# Patient Record
Sex: MALE | Race: BLACK OR AFRICAN AMERICAN | NOT HISPANIC OR LATINO
[De-identification: names, ages, dates, MRNs, and addresses within clinical notes are randomized per-mention and may not be internally consistent; named-entity substitution may affect disease eponyms.]

---

## 2020-10-12 ENCOUNTER — APPOINTMENT (OUTPATIENT)
Dept: SURGERY | Facility: CLINIC | Age: 75
End: 2020-10-12

## 2020-10-26 ENCOUNTER — APPOINTMENT (OUTPATIENT)
Dept: SURGERY | Facility: CLINIC | Age: 75
End: 2020-10-26
Payer: COMMERCIAL

## 2020-10-26 VITALS
SYSTOLIC BLOOD PRESSURE: 165 MMHG | BODY MASS INDEX: 28.95 KG/M2 | HEIGHT: 68 IN | WEIGHT: 191 LBS | HEART RATE: 62 BPM | TEMPERATURE: 98 F | DIASTOLIC BLOOD PRESSURE: 90 MMHG

## 2020-10-26 DIAGNOSIS — I10 ESSENTIAL (PRIMARY) HYPERTENSION: ICD-10-CM

## 2020-10-26 DIAGNOSIS — E78.00 PURE HYPERCHOLESTEROLEMIA, UNSPECIFIED: ICD-10-CM

## 2020-10-26 DIAGNOSIS — Z78.9 OTHER SPECIFIED HEALTH STATUS: ICD-10-CM

## 2020-10-26 DIAGNOSIS — K40.90 UNILATERAL INGUINAL HERNIA, W/OUT OBSTRUCTION OR GANGRENE, NOT SPECIFIED AS RECURRENT: ICD-10-CM

## 2020-10-26 PROCEDURE — 99203 OFFICE O/P NEW LOW 30 MIN: CPT

## 2020-10-26 PROCEDURE — 99072 ADDL SUPL MATRL&STAF TM PHE: CPT

## 2020-10-26 RX ORDER — ASPIRIN 81 MG
81 TABLET, DELAYED RELEASE (ENTERIC COATED) ORAL
Refills: 0 | Status: ACTIVE | COMMUNITY

## 2020-10-26 RX ORDER — BENAZEPRIL HYDROCHLORIDE 5 MG/1
TABLET ORAL
Refills: 0 | Status: ACTIVE | COMMUNITY

## 2020-10-26 RX ORDER — HYDROCHLOROTHIAZIDE 12.5 MG/1
12.5 TABLET ORAL
Refills: 0 | Status: ACTIVE | COMMUNITY

## 2020-10-26 RX ORDER — ATORVASTATIN CALCIUM 80 MG/1
TABLET, FILM COATED ORAL
Refills: 0 | Status: ACTIVE | COMMUNITY

## 2020-10-26 RX ORDER — TAMSULOSIN HYDROCHLORIDE 0.4 MG/1
CAPSULE ORAL
Refills: 0 | Status: ACTIVE | COMMUNITY

## 2020-10-26 RX ORDER — AMLODIPINE BESYLATE 5 MG/1
TABLET ORAL
Refills: 0 | Status: ACTIVE | COMMUNITY

## 2020-10-26 NOTE — CONSULT LETTER
[Dear  ___] : Dear  [unfilled], [Consult Letter:] : I had the pleasure of evaluating your patient, [unfilled]. [Consult Closing:] : Thank you very much for allowing me to participate in the care of this patient.  If you have any questions, please do not hesitate to contact me. [Sincerely,] : Sincerely, [FreeTextEntry3] : Rc Wayne MD\par

## 2020-10-26 NOTE — PLAN
[FreeTextEntry1] : Patient with RIGHT inguinal hernia. Patient was informed of significance of findings. All the options, risks and benefits were discussed. The use of mesh and the small potential for infection, recurrence and other related complications were discussed. Patient wishes to proceed with surgery. We will plan for surgery on at the next available date. Patient agrees to obtain any necessary pre-operative evaluations and testing prior to surgery. Patient's questions and concerns addressed to patient's satisfaction, and patient verbalized an understanding of the information discussed.\par \par Will schedule for the surgery at [facility]. \par \par \par

## 2020-10-26 NOTE — REVIEW OF SYSTEMS
[As Noted in HPI] : as noted in HPI [Negative] : Heme/Lymph [FreeTextEntry8] : right groin discomfort, comes and goes

## 2020-10-26 NOTE — HISTORY OF PRESENT ILLNESS
[de-identified] : NEW MC is a 74 year old M w PMHX, HTN, HLD, CAD with hx of stent placement (10 years ago) on ASA 81mg, who is referred to the office for consultation visit, he presents w the cc of having discomfort to the right side of groin, which is on and off. He notices the discomfort more with movement, walking. Patient states he's been having discomfort >1 month. \par

## 2020-10-26 NOTE — PHYSICAL EXAM
[Normal Breath Sounds] : Normal breath sounds [Normal Rate and Rhythm] : normal rate and rhythm [No Rash or Lesion] : No rash or lesion [Alert] : alert [Oriented to Person] : oriented to person [Oriented to Place] : oriented to place [Oriented to Time] : oriented to time [Calm] : calm [JVD] : no jugular venous distention  [de-identified] : A/Ox3; NAD. appears comfortable [de-identified] : EOMI; sclera anicteric. Nasal mucosa pink, septum midline. Oral mucosa pink. Tongue midline, Pharynx without exudates. [de-identified] : Abd is soft, nondistended, no rebound or guarding. No abdominal masses. No abdominal tenderness. [de-identified] : reducible, right inguinal hernia  [de-identified] : +ROM, no joint swelling

## 2020-11-06 ENCOUNTER — OUTPATIENT (OUTPATIENT)
Dept: OUTPATIENT SERVICES | Facility: HOSPITAL | Age: 75
LOS: 1 days | End: 2020-11-06
Payer: COMMERCIAL

## 2020-11-06 VITALS
OXYGEN SATURATION: 98 % | RESPIRATION RATE: 16 BRPM | SYSTOLIC BLOOD PRESSURE: 126 MMHG | HEIGHT: 68 IN | HEART RATE: 50 BPM | TEMPERATURE: 98 F | WEIGHT: 190.92 LBS | DIASTOLIC BLOOD PRESSURE: 77 MMHG

## 2020-11-06 DIAGNOSIS — Z84.89 FAMILY HISTORY OF OTHER SPECIFIED CONDITIONS: Chronic | ICD-10-CM

## 2020-11-06 DIAGNOSIS — Z98.890 OTHER SPECIFIED POSTPROCEDURAL STATES: Chronic | ICD-10-CM

## 2020-11-06 DIAGNOSIS — I10 ESSENTIAL (PRIMARY) HYPERTENSION: ICD-10-CM

## 2020-11-06 DIAGNOSIS — Z95.5 PRESENCE OF CORONARY ANGIOPLASTY IMPLANT AND GRAFT: ICD-10-CM

## 2020-11-06 DIAGNOSIS — K40.90 UNILATERAL INGUINAL HERNIA, WITHOUT OBSTRUCTION OR GANGRENE, NOT SPECIFIED AS RECURRENT: ICD-10-CM

## 2020-11-06 DIAGNOSIS — Z01.818 ENCOUNTER FOR OTHER PREPROCEDURAL EXAMINATION: ICD-10-CM

## 2020-11-06 DIAGNOSIS — E78.5 HYPERLIPIDEMIA, UNSPECIFIED: ICD-10-CM

## 2020-11-06 PROCEDURE — G0463: CPT

## 2020-11-06 RX ORDER — TAMSULOSIN HYDROCHLORIDE 0.4 MG/1
1 CAPSULE ORAL
Qty: 0 | Refills: 0 | DISCHARGE

## 2020-11-06 NOTE — H&P PST ADULT - NEGATIVE NEUROLOGICAL SYMPTOMS
no loss of sensation/no headache/no confusion/no focal seizures/no tremors/no hemiparesis/no facial palsy/no syncope/no difficulty walking/no loss of consciousness/no transient paralysis/no generalized seizures/no vertigo/no weakness/no paresthesias

## 2020-11-06 NOTE — H&P PST ADULT - NSICDXPASTSURGICALHX_GEN_ALL_CORE_FT
PAST SURGICAL HISTORY:  FHx: total knee replacement 2010    S/P cardiac cath 2009 [facility] stents x 2

## 2020-11-06 NOTE — H&P PST ADULT - NEGATIVE GASTROINTESTINAL SYMPTOMS
No
no nausea/no vomiting/no constipation/no change in bowel habits/no diarrhea/no flatulence/no abdominal pain

## 2020-11-06 NOTE — H&P PST ADULT - ASSESSMENT
74 years old male presented  with unilateral inguinal hernia without obstruction or gangrene , not specified ans recurrent .

## 2020-11-06 NOTE — H&P PST ADULT - CARDIOVASCULAR COMMENTS
CAD with 2 coronary stents, hx of carotid arteries stenosis, HLD, HTN Cardiology is aware of pt bradycardia- is asymptomatic

## 2020-11-06 NOTE — H&P PST ADULT - NEGATIVE OPHTHALMOLOGIC SYMPTOMS
no blurred vision R/no discharge R/no irritation L/no discharge L/no irritation R/no photophobia/no pain R/no diplopia/no pain L/no blurred vision L/no lacrimation L/no lacrimation R

## 2020-11-06 NOTE — H&P PST ADULT - HISTORY OF PRESENT ILLNESS
74 years old male presented to [facility] for evaluation before surgery, patient was diagnosed with unilateral inguinal hernia without obstruction or gangrene , not specified ans recurrent and is scheduled for right inguinal hernia repair with mesh on 11/13/2020.

## 2020-11-06 NOTE — H&P PST ADULT - NSICDXPASTMEDICALHX_GEN_ALL_CORE_FT
PAST MEDICAL HISTORY:  BPH (benign prostatic hypertrophy)     Bradycardia Cardiology is aware , pt is cleared , no cardiac symptoms, no dizziness, no fainting- asymptomatic    Coronary artery disease involving native coronary artery of native heart, angina presence unspecified     Essential hypertension     Glaucoma both eyes    History of carotid artery disease f/u with Cardiology    Hypercholesterolemia     Stented coronary artery in 2009 - 2 stents

## 2020-11-06 NOTE — H&P PST ADULT - GASTROINTESTINAL DETAILS
bowel sounds normal/no bruit/no distention/no masses palpable/soft/normal/nontender/no rebound tenderness

## 2020-11-06 NOTE — H&P PST ADULT - NEGATIVE BREAST SYMPTOMS
no breast tenderness R/no breast lump L/no nipple discharge L/no breast tenderness L/no breast lump R/no nipple discharge R

## 2020-11-06 NOTE — H&P PST ADULT - NEGATIVE CARDIOVASCULAR SYMPTOMS
no palpitations/no chest pain/no dyspnea on exertion/no orthopnea/no peripheral edema/no claudication/no paroxysmal nocturnal dyspnea

## 2020-11-06 NOTE — H&P PST ADULT - NSICDXPROBLEM_GEN_ALL_CORE_FT
PROBLEM DIAGNOSES  Problem: HLD (hyperlipidemia)  Assessment and Plan: Instructed pt to continue Atorvastatin. Follow-up with PCP postop for lipid management      Problem: Hypertension  Assessment and Plan: Continue antihypertensive meds and take with sips of water on day of surgery.  Cleared by Cardiology .Follow-up with PCP postop for management.    Problem: Stented coronary artery  Assessment and Plan: Pt to continue ASA 81 mg everyday including am of sx, pt verbalized understanding , pt has Cardiology clearance     Problem: Unilateral inguinal hernia, without obstruction or gangrene, not specified as recurrent  Assessment and Plan: Patient is scheduled for right inguinal hernia repair with mesh on 11/13/2020.

## 2020-11-06 NOTE — H&P PST ADULT - RS GEN PE MLT RESP DETAILS PC
no wheezes/no rhonchi/airway patent/breath sounds equal/good air movement/clear to auscultation bilaterally/no rales

## 2020-11-06 NOTE — H&P PST ADULT - NEGATIVE GENERAL GENITOURINARY SYMPTOMS
normal urinary frequency/no hematuria/no urinary hesitancy/no renal colic/no dysuria/no incontinence/BPH on  meds

## 2020-11-10 ENCOUNTER — APPOINTMENT (OUTPATIENT)
Dept: DISASTER EMERGENCY | Facility: CLINIC | Age: 75
End: 2020-11-10

## 2020-11-10 DIAGNOSIS — Z01.818 ENCOUNTER FOR OTHER PREPROCEDURAL EXAMINATION: ICD-10-CM

## 2020-11-11 LAB — SARS-COV-2 N GENE NPH QL NAA+PROBE: NOT DETECTED

## 2020-11-12 ENCOUNTER — TRANSCRIPTION ENCOUNTER (OUTPATIENT)
Age: 75
End: 2020-11-12

## 2020-11-12 LAB
ALBUMIN SERPL ELPH-MCNC: 4.4 G/DL
ALP BLD-CCNC: 93 U/L
ALT SERPL-CCNC: 13 U/L
ANION GAP SERPL CALC-SCNC: 16 MMOL/L
AST SERPL-CCNC: 16 U/L
BASOPHILS # BLD AUTO: 0.07 K/UL
BASOPHILS NFR BLD AUTO: 1.1 %
BILIRUB SERPL-MCNC: 1.4 MG/DL
BUN SERPL-MCNC: 18 MG/DL
CALCIUM SERPL-MCNC: 10.2 MG/DL
CHLORIDE SERPL-SCNC: 101 MMOL/L
CO2 SERPL-SCNC: 26 MMOL/L
CREAT SERPL-MCNC: 1.5 MG/DL
EOSINOPHIL # BLD AUTO: 0.15 K/UL
EOSINOPHIL NFR BLD AUTO: 2.3 %
GLUCOSE SERPL-MCNC: 97 MG/DL
HCT VFR BLD CALC: 44.2 %
HGB BLD-MCNC: 14.5 G/DL
IMM GRANULOCYTES NFR BLD AUTO: 0.2 %
LYMPHOCYTES # BLD AUTO: 1.65 K/UL
LYMPHOCYTES NFR BLD AUTO: 25.5 %
MAN DIFF?: NORMAL
MCHC RBC-ENTMCNC: 30 PG
MCHC RBC-ENTMCNC: 32.8 GM/DL
MCV RBC AUTO: 91.5 FL
MONOCYTES # BLD AUTO: 0.6 K/UL
MONOCYTES NFR BLD AUTO: 9.3 %
NEUTROPHILS # BLD AUTO: 3.98 K/UL
NEUTROPHILS NFR BLD AUTO: 61.6 %
PLATELET # BLD AUTO: 198 K/UL
POTASSIUM SERPL-SCNC: 3.6 MMOL/L
PROT SERPL-MCNC: 6.8 G/DL
RBC # BLD: 4.83 M/UL
RBC # FLD: 12.6 %
SODIUM SERPL-SCNC: 143 MMOL/L
WBC # FLD AUTO: 6.46 K/UL

## 2020-11-13 ENCOUNTER — APPOINTMENT (OUTPATIENT)
Dept: SURGERY | Facility: HOSPITAL | Age: 75
End: 2020-11-13

## 2020-11-13 ENCOUNTER — RESULT REVIEW (OUTPATIENT)
Age: 75
End: 2020-11-13

## 2020-11-13 ENCOUNTER — OUTPATIENT (OUTPATIENT)
Dept: OUTPATIENT SERVICES | Facility: HOSPITAL | Age: 75
LOS: 1 days | End: 2020-11-13
Payer: COMMERCIAL

## 2020-11-13 VITALS
TEMPERATURE: 98 F | SYSTOLIC BLOOD PRESSURE: 148 MMHG | WEIGHT: 195.11 LBS | DIASTOLIC BLOOD PRESSURE: 74 MMHG | HEART RATE: 53 BPM | RESPIRATION RATE: 17 BRPM | OXYGEN SATURATION: 97 % | HEIGHT: 68 IN

## 2020-11-13 VITALS
HEART RATE: 57 BPM | DIASTOLIC BLOOD PRESSURE: 87 MMHG | OXYGEN SATURATION: 96 % | SYSTOLIC BLOOD PRESSURE: 143 MMHG | RESPIRATION RATE: 7 BRPM | TEMPERATURE: 98 F

## 2020-11-13 DIAGNOSIS — Z98.890 OTHER SPECIFIED POSTPROCEDURAL STATES: Chronic | ICD-10-CM

## 2020-11-13 DIAGNOSIS — K40.90 UNILATERAL INGUINAL HERNIA, WITHOUT OBSTRUCTION OR GANGRENE, NOT SPECIFIED AS RECURRENT: ICD-10-CM

## 2020-11-13 DIAGNOSIS — Z84.89 FAMILY HISTORY OF OTHER SPECIFIED CONDITIONS: Chronic | ICD-10-CM

## 2020-11-13 PROCEDURE — 88302 TISSUE EXAM BY PATHOLOGIST: CPT | Mod: 26

## 2020-11-13 PROCEDURE — 49525 REPAIR ING HERNIA SLIDING: CPT | Mod: RT

## 2020-11-13 PROCEDURE — 49525 REPAIR ING HERNIA SLIDING: CPT | Mod: AS,RT

## 2020-11-13 PROCEDURE — 88302 TISSUE EXAM BY PATHOLOGIST: CPT

## 2020-11-13 PROCEDURE — C1781: CPT

## 2020-11-13 RX ORDER — OXYCODONE AND ACETAMINOPHEN 5; 325 MG/1; MG/1
1 TABLET ORAL EVERY 6 HOURS
Refills: 0 | Status: DISCONTINUED | OUTPATIENT
Start: 2020-11-13 | End: 2020-11-13

## 2020-11-13 RX ORDER — PREGABALIN 225 MG/1
1 CAPSULE ORAL
Qty: 0 | Refills: 0 | DISCHARGE

## 2020-11-13 RX ORDER — SODIUM CHLORIDE 9 MG/ML
3 INJECTION INTRAMUSCULAR; INTRAVENOUS; SUBCUTANEOUS EVERY 8 HOURS
Refills: 0 | Status: DISCONTINUED | OUTPATIENT
Start: 2020-11-13 | End: 2020-11-13

## 2020-11-13 RX ORDER — TAMSULOSIN HYDROCHLORIDE 0.4 MG/1
1 CAPSULE ORAL
Qty: 0 | Refills: 0 | DISCHARGE

## 2020-11-13 RX ORDER — ATORVASTATIN CALCIUM 80 MG/1
1 TABLET, FILM COATED ORAL
Qty: 0 | Refills: 0 | DISCHARGE

## 2020-11-13 RX ORDER — HYDROMORPHONE HYDROCHLORIDE 2 MG/ML
1 INJECTION INTRAMUSCULAR; INTRAVENOUS; SUBCUTANEOUS
Refills: 0 | Status: DISCONTINUED | OUTPATIENT
Start: 2020-11-13 | End: 2020-11-13

## 2020-11-13 RX ORDER — ACETAMINOPHEN WITH CODEINE 300MG-30MG
1 TABLET ORAL
Qty: 16 | Refills: 0
Start: 2020-11-13 | End: 2020-11-16

## 2020-11-13 RX ORDER — LATANOPROST 0.05 MG/ML
1 SOLUTION/ DROPS OPHTHALMIC; TOPICAL
Qty: 0 | Refills: 0 | DISCHARGE

## 2020-11-13 RX ORDER — ONDANSETRON 8 MG/1
4 TABLET, FILM COATED ORAL EVERY 6 HOURS
Refills: 0 | Status: DISCONTINUED | OUTPATIENT
Start: 2020-11-13 | End: 2020-11-20

## 2020-11-13 RX ORDER — SODIUM CHLORIDE 9 MG/ML
1000 INJECTION, SOLUTION INTRAVENOUS
Refills: 0 | Status: DISCONTINUED | OUTPATIENT
Start: 2020-11-13 | End: 2020-11-13

## 2020-11-13 RX ORDER — FOLIC ACID 0.8 MG
1 TABLET ORAL
Qty: 0 | Refills: 0 | DISCHARGE

## 2020-11-13 RX ORDER — HYDROMORPHONE HYDROCHLORIDE 2 MG/ML
0.5 INJECTION INTRAMUSCULAR; INTRAVENOUS; SUBCUTANEOUS
Refills: 0 | Status: DISCONTINUED | OUTPATIENT
Start: 2020-11-13 | End: 2020-11-13

## 2020-11-13 RX ADMIN — SODIUM CHLORIDE 3 MILLILITER(S): 9 INJECTION INTRAMUSCULAR; INTRAVENOUS; SUBCUTANEOUS at 09:37

## 2020-11-13 NOTE — ASU PATIENT PROFILE, ADULT - HEALTHCARE QUESTIONS, PROFILE
Called Danielito to assess how he's feeling after IV Lasix and increased Torsemide dosing. He states his scale hasn't budged. 208.5 lbs yesterday and today. States he just didn't urinate much after the IV Lasix. Thinks he got about 400 ml output after receiving IV Lasix. States he's only voiding 40-50 ml at a time right now, usually he voids more then this. States he couldn't sleep last night so he called the on-call cardiologist who told him to take an extra tab of Torsemide, so he took a total of 80 mg last night.   Told him I would review this with Aster and call him back.     Cecelia Palacios RN    none

## 2020-11-13 NOTE — ASU DISCHARGE PLAN (ADULT/PEDIATRIC) - CARE PROVIDER_API CALL
Rc Wayne (MD)  Surgery  [address] [place] [address] [id number]  Phone: [phone]  Fax: [phone]  Established Patient  Follow Up Time: 1 week

## 2020-11-13 NOTE — ASU DISCHARGE PLAN (ADULT/PEDIATRIC) - CALL YOUR DOCTOR IF YOU HAVE ANY OF THE FOLLOWING:
Swelling that gets worse/Bleeding that does not stop/Pain not relieved by Medications/Wound/Surgical Site with redness, or foul smelling discharge or pus/Fever greater than (need to indicate Fahrenheit or Celsius)

## 2020-11-13 NOTE — ASU DISCHARGE PLAN (ADULT/PEDIATRIC) - WORK/SCHOOL DURATION DAY(S)

## 2020-11-14 ENCOUNTER — TRANSCRIPTION ENCOUNTER (OUTPATIENT)
Age: 75
End: 2020-11-14

## 2020-11-16 PROBLEM — R00.1 BRADYCARDIA, UNSPECIFIED: Chronic | Status: ACTIVE | Noted: 2020-11-06

## 2020-11-16 PROBLEM — H40.9 UNSPECIFIED GLAUCOMA: Chronic | Status: ACTIVE | Noted: 2020-11-06

## 2020-11-16 PROBLEM — Z86.79 PERSONAL HISTORY OF OTHER DISEASES OF THE CIRCULATORY SYSTEM: Chronic | Status: ACTIVE | Noted: 2020-11-06

## 2020-11-17 LAB — SURGICAL PATHOLOGY STUDY: SIGNIFICANT CHANGE UP

## 2020-11-19 ENCOUNTER — APPOINTMENT (OUTPATIENT)
Dept: SURGERY | Facility: CLINIC | Age: 75
End: 2020-11-19
Payer: COMMERCIAL

## 2020-11-19 PROCEDURE — 99024 POSTOP FOLLOW-UP VISIT: CPT

## 2020-11-19 NOTE — REASON FOR VISIT
[Post Op: _________] : a [unfilled] post op visit [FreeTextEntry1] : S/P repair of right inguinal hernia w mesh, 11/13/20

## 2020-11-19 NOTE — PHYSICAL EXAM
[Normal Breath Sounds] : Normal breath sounds [Normal Rate and Rhythm] : normal rate and rhythm [No Rash or Lesion] : No rash or lesion [Alert] : alert [Oriented to Person] : oriented to person [Oriented to Place] : oriented to place [Oriented to Time] : oriented to time [Calm] : calm [JVD] : no jugular venous distention  [de-identified] : A/Ox3; NAD. appears comfortable [de-identified] : / [de-identified] : surgical wound healing well. No signs of inflammation, infection or exudate. No recurrence \par

## 2020-11-19 NOTE — HISTORY OF PRESENT ILLNESS
[de-identified] : NEW MC presents to the office for postoperative visit today, he is S/P repair of right inguinal hernia w mesh, 11/13/20. Patient states he's feeling well overall, has some mild swelling to the incision site. No fevers/chills. No pain. No urinary complaints.

## 2020-11-19 NOTE — ASSESSMENT
[FreeTextEntry1] : NEW MC presents to the office for postoperative visit today, he is S/P repair of right inguinal hernia w mesh, 11/13/20. Patient is feeling well, denies pain. \par \par Surgical wounds are healing well. No signs of inflammation, infection or exudate. No recurrence. \par There is no evidence of infection/complication, and patient is progressing as expected. Post-operative wound care, activities, restrictions and precautions were reinforced. \par \par Patient's questions and concerns addressed to patient's satisfaction.\par \par \par

## 2021-10-14 ENCOUNTER — APPOINTMENT (OUTPATIENT)
Dept: UROLOGY | Facility: CLINIC | Age: 76
End: 2021-10-14
Payer: COMMERCIAL

## 2021-10-14 VITALS
HEIGHT: 68 IN | BODY MASS INDEX: 28.95 KG/M2 | TEMPERATURE: 98.1 F | RESPIRATION RATE: 13 BRPM | HEART RATE: 80 BPM | DIASTOLIC BLOOD PRESSURE: 74 MMHG | WEIGHT: 191 LBS | SYSTOLIC BLOOD PRESSURE: 134 MMHG | OXYGEN SATURATION: 94 %

## 2021-10-14 DIAGNOSIS — R35.1 BENIGN PROSTATIC HYPERPLASIA WITH LOWER URINARY TRACT SYMPMS: ICD-10-CM

## 2021-10-14 DIAGNOSIS — N28.1 CYST OF KIDNEY, ACQUIRED: ICD-10-CM

## 2021-10-14 DIAGNOSIS — I51.9 HEART DISEASE, UNSPECIFIED: ICD-10-CM

## 2021-10-14 DIAGNOSIS — N40.1 BENIGN PROSTATIC HYPERPLASIA WITH LOWER URINARY TRACT SYMPMS: ICD-10-CM

## 2021-10-14 PROCEDURE — 99203 OFFICE O/P NEW LOW 30 MIN: CPT

## 2021-10-14 NOTE — ASSESSMENT
[FreeTextEntry1] : He does not seem to be significantly bothered by urinary symptoms.  Nocturia is 1 or 2 times in the residual urine volume is minimal.  We reviewed his ultrasound report on the computer screen.  Simple renal cysts are sacs filled with fluid. The exact cause of renal cysts is unknown but cysts are more common with advancing age. Up to one third of patients over age 70 have renal cysts. Having many renal cysts is different than polycystic renal disease. Simple renal cysts are usually found incidentally with imaging studies (US, CT scan or MRI), are usually asymptomatic and do not require any treatment. Very large renal cysts can cause dull pain or discomfort. Cysts can rarely become infected, develop bleeding, rupture or impair renal function. Surgical removal or drainage and sclerotherapy of renal cysts can be performed in specific clinical settings. Bosniak classification of renal cysts into 5 categories was reviewed: simple (category I), minimally complex (category II and IIF), indeterminate (category III) and solid (category IV). Malignancy risk, treatment and follow-up of renal cysts based on category were discussed. Patient's questions were answered.  Pending insurance approval MRI of the abdomen with and without contrast will be ordered and then we will discuss the next step on the phone.\par \par Jamaal Currie MD, FACS\par [facility]\par  of Urology\par \par [address], [address]\[place] [address]\par \par [address], [address]\[place] [address]\par \par Tel: [phone]\par Fax: [phone]

## 2021-10-14 NOTE — HISTORY OF PRESENT ILLNESS
[FreeTextEntry1] : He is a 75-year-old man who is seen today for initial visit.  Generally nocturia is 1-2 times.  Urinary flow is average.  There is no hematuria, dysuria or flank pain.  Residual urine today was 60 cc.  In September 2020, PSA level was 2.78 and creatinine 1.5.  Urinalysis was normal.  Ultrasound in June 2021 from [facility] radiology showed right renal 2.3 cm complex renal cyst, left 6 mm cyst with calcification and other bilateral simple renal cysts.

## 2021-10-14 NOTE — REVIEW OF SYSTEMS
[Eyesight Problems] : eyesight problems [Dry Eyes] : dryness of the eyes [Loss of interest] : loss of interest in sexual activity [Joint Swelling] : joint swelling [Negative] : Heme/Lymph

## 2021-10-14 NOTE — LETTER BODY
[Dear  ___] : Dear  [unfilled], [Consult Letter:] : I had the pleasure of evaluating your patient, [unfilled]. [Consult Closing:] : Thank you very much for allowing me to participate in the care of this patient.  If you have any questions, please do not hesitate to contact me. [FreeTextEntry1] : [facility]\par [address] \[place], [place], [address] \par [phone]

## 2021-10-14 NOTE — PHYSICAL EXAM
[General Appearance - Well Developed] : well developed [General Appearance - Well Nourished] : well nourished [Normal Appearance] : normal appearance [Well Groomed] : well groomed [General Appearance - In No Acute Distress] : no acute distress [Edema] : no peripheral edema [Respiration, Rhythm And Depth] : normal respiratory rhythm and effort [Exaggerated Use Of Accessory Muscles For Inspiration] : no accessory muscle use [Abdomen Soft] : soft [Abdomen Tenderness] : non-tender [Costovertebral Angle Tenderness] : no ~M costovertebral angle tenderness [Urethral Meatus] : meatus normal [Penis Abnormality] : normal uncircumcised penis [Urinary Bladder Findings] : the bladder was normal on palpation [Scrotum] : the scrotum was normal [Testes Tenderness] : no tenderness of the testes [Testes Mass (___cm)] : there were no testicular masses [Prostate Tenderness] : the prostate was not tender [No Prostate Nodules] : no prostate nodules [Prostate Enlarged] : was enlarged [Normal Station and Gait] : the gait and station were normal for the patient's age [] : no rash [No Focal Deficits] : no focal deficits [Oriented To Time, Place, And Person] : oriented to person, place, and time [Affect] : the affect was normal [Mood] : the mood was normal [Not Anxious] : not anxious [Inguinal Lymph Nodes Enlarged Bilaterally] : inguinal

## 2021-12-09 ENCOUNTER — APPOINTMENT (OUTPATIENT)
Dept: MRI IMAGING | Facility: CLINIC | Age: 76
End: 2021-12-09
Payer: COMMERCIAL

## 2021-12-09 ENCOUNTER — OUTPATIENT (OUTPATIENT)
Dept: OUTPATIENT SERVICES | Facility: HOSPITAL | Age: 76
LOS: 1 days | End: 2021-12-09
Payer: COMMERCIAL

## 2021-12-09 DIAGNOSIS — Z98.890 OTHER SPECIFIED POSTPROCEDURAL STATES: Chronic | ICD-10-CM

## 2021-12-09 DIAGNOSIS — N28.1 CYST OF KIDNEY, ACQUIRED: ICD-10-CM

## 2021-12-09 DIAGNOSIS — Z84.89 FAMILY HISTORY OF OTHER SPECIFIED CONDITIONS: Chronic | ICD-10-CM

## 2021-12-09 PROCEDURE — 74183 MRI ABD W/O CNTR FLWD CNTR: CPT

## 2021-12-09 PROCEDURE — 74183 MRI ABD W/O CNTR FLWD CNTR: CPT | Mod: 26

## 2021-12-09 PROCEDURE — A9585: CPT

## 2024-03-27 ENCOUNTER — APPOINTMENT (OUTPATIENT)
Dept: ORTHOPEDIC SURGERY | Facility: CLINIC | Age: 79
End: 2024-03-27
Payer: COMMERCIAL

## 2024-03-27 VITALS — BODY MASS INDEX: 28.79 KG/M2 | WEIGHT: 190 LBS | HEIGHT: 68 IN

## 2024-03-27 DIAGNOSIS — M17.12 UNILATERAL PRIMARY OSTEOARTHRITIS, LEFT KNEE: ICD-10-CM

## 2024-03-27 PROCEDURE — 99203 OFFICE O/P NEW LOW 30 MIN: CPT

## 2024-03-27 PROCEDURE — 73564 X-RAY EXAM KNEE 4 OR MORE: CPT | Mod: LT

## 2024-03-27 NOTE — CONSULT LETTER
[Dear  ___] : Dear  [unfilled], [Sincerely,] : Sincerely, [FreeTextEntry3] : Dr. Tone Hayward [facility] [address] [place] [address]  [phone]

## 2024-03-27 NOTE — HISTORY OF PRESENT ILLNESS
[de-identified] : NEW MC is a 78 year old male who presents for initial evaluation of left knee pain. Patient presents ambulating independently.  Patient reports pain intermittently since 2023. Denies any trauma or injury. Patient reports recent aggravation of symptoms while hiking in [place].  The patient describes the pain as a dull aching, and occasionally sharp pain localized to the medial aspect of his knee that is intermittent in nature. Provocation of pain by walking, stairs and prolonged activity. Denies any swelling, locking, or giving out of the knee.  Of note, SHx of right total knee replacement ~10 years ago.

## 2024-03-27 NOTE — ADDENDUM
[FreeTextEntry1] : I, Radha Oliver, acted solely as a scribe for Dr. Jose Manuel Hayward MD on this date  03/27/2024  All medical record entries made by the Scribe were at my, Dr. Jose Manuel Hayward MD , direction and personally dictated by me on 03/27/2024. I have reviewed the chart and agree that the record accurately reflects my personal performance of the history, physical exam, assessment and plan. I have also personally directed, reviewed, and agreed with the chart.

## 2024-03-27 NOTE — PHYSICAL EXAM
[de-identified] : Constitutional: Well nourished , well developed and in no acute distress Psychiatric: Alert and oriented to time place and person.Appropriate affect . Skin: Head, neck, arms and lower extremities:no lesions or discoloration HEENT: Normocephalic, EOM intact, Nasal septum midline, Respiratory: Unlabored respirations,no audible wheezing ,no tachypnea, no cyanosis Cardiovascular: No leg swelling no ankle edema no JVD, pulse regular Vascular: No calf or thigh tenderness, Peripheral pulses: intact Lymphatics: No groin adenopathy,no lymphedema lower or upper extremities   o Left Knee Exam: Inspection/Palpation : Palpable prominence over the popliteal fossa, Effusion 2+, Varus deformity Range of Motion : 0 - 115 degrees, no crepitus Stability : no valgus or varus instability present on provocative testing, Lachmans Test (-) Sensation : sensation to pin intact Vascular Exam : no edema, no cyanosis, dorsalis pedis artery pulse 2+ Skin : no erythema, no ecchymosis   o Right Knee Exam: Inspection/Palpation : no tenderness to palpation, no swelling, no deformity, surgical incision well healed. Range of Motion : 0 - 125 degrees, no crepitus Stability : no valgus or varus instability present on provocative testing, Lachmans Test (-) Sensation : sensation to pin intact Vascular Exam : no edema, no cyanosis, dorsalis pedis artery pulse 2+, posterior tibial artery pulse 2+ Skin : no erythema, no ecchymosis [de-identified] : o 4 views of the LEFT knee obtained today 03/27/2024 demonstrates medial compartment degenerative narrowing with a bone on bone opposition, marginal osteophyte formation, subchondral sclerosis, varus alignment.

## 2024-03-27 NOTE — DISCUSSION/SUMMARY
[de-identified] : 78 year male old with osteoarthritis left knee. We discussed at length the nature of the patient's condition. We discussed all options and conservative measures, such as ice, NSAIDs, physical therapy, exercise limitations, and injections.  Activity modifications and restrictions were discussed.   FU 6 months.   The patient understood and verbally agreed to the treatment plan. All of their questions were answered. The patient should call the office if they have any questions or experience worsening symptoms.

## 2024-06-16 NOTE — ASU DISCHARGE PLAN (ADULT/PEDIATRIC) - DO NOT TAKE THE STERI STRIPS OFF
Writer spoke with patient's mother Hattie over the phone, as requested by the patient. Update provided, questions answered. She reports talking to the patient on FaceTime at noon and noticing he was starting to have mild tremors. At that time, she was told by staff that the patient was given a dose of lorazepam at 1200. This is not documented in the MAR provided by the facility. Patient's mother upset and voices concern that his tremors are not being treated appropriately by facility staff.   Statement Selected

## 2024-10-02 ENCOUNTER — APPOINTMENT (OUTPATIENT)
Dept: ORTHOPEDIC SURGERY | Facility: CLINIC | Age: 79
End: 2024-10-02
Payer: COMMERCIAL

## 2024-10-02 VITALS — BODY MASS INDEX: 29.55 KG/M2 | HEIGHT: 68 IN | WEIGHT: 195 LBS

## 2024-10-02 DIAGNOSIS — M17.12 UNILATERAL PRIMARY OSTEOARTHRITIS, LEFT KNEE: ICD-10-CM

## 2024-10-02 PROCEDURE — 99213 OFFICE O/P EST LOW 20 MIN: CPT

## 2024-10-02 NOTE — HISTORY OF PRESENT ILLNESS
[de-identified] : NEW MC is a 78-year-old male who presents for follow-up of left knee pain. Patient presents ambulating independently.  Patient reports pain intermittently since 2023. Denies any trauma or injury. Patient reports pain when doing "extra" however, day to day activities is pain free. The patient describes the pain as a dull aching, localized to the anterior suprapatellar aspect of his knee that is intermittent in nature. Provocation of pain by walking, stairs and prolonged activity. Denies any swelling, locking, or giving out of the knee.   Of note, SHx of right total knee replacement ~10 years ago.

## 2024-10-02 NOTE — DISCUSSION/SUMMARY
[de-identified] : 78 year male old with osteoarthritis left knee. We discussed at length the nature of the patient's condition. He is doing better and managing his pain well since last visit. We discussed all options and conservative measures, such as ice, NSAIDs, physical therapy, exercise limitations, and injections.  I discussed with patient that if and when patient feels quality of life is significant compromised and unresponsive to conservative management patient will be considered a candidate for total left replacement. I discussed with patient that if and when patient feels quality of life is significant compromised and unresponsive to conservative management patient will be considered a candidate for total left replacement.  FU 6 months.   The patient understood and verbally agreed to the treatment plan. All of their questions were answered. The patient should call the office if they have any questions or experience worsening symptoms.

## 2024-10-02 NOTE — PHYSICAL EXAM
[de-identified] : Constitutional: Well nourished , well developed and in no acute distress Psychiatric: Alert and oriented to time place and person.Appropriate affect . Skin: Head, neck, arms and lower extremities:no lesions or discoloration HEENT: Normocephalic, EOM intact, Nasal septum midline, Respiratory: Unlabored respirations,no audible wheezing ,no tachypnea, no cyanosis Cardiovascular: No leg swelling no ankle edema no JVD, pulse regular Vascular: No calf or thigh tenderness, Peripheral pulses: intact Lymphatics: No groin adenopathy,no lymphedema lower or upper extremities   o Left Knee Exam: Inspection/Palpation : Palpable prominence over the popliteal fossa, Effusion 2+, Mild varus deformity Range of Motion : 0 - 125 degrees, + crepitus Stability : no valgus or varus instability present on provocative testing, Lachmans Test (-) Sensation : sensation to pin intact Vascular Exam : no edema, no cyanosis, dorsalis pedis artery pulse 2+ Skin : no erythema, no ecchymosis   o Right Knee Exam: Inspection/Palpation : no tenderness to palpation, no swelling, no deformity, surgical incision well healed. Range of Motion : 0 - 125 degrees, no crepitus Stability : no valgus or varus instability present on provocative testing, Lachmans Test (-) Sensation : sensation to pin intact Vascular Exam : no edema, no cyanosis, dorsalis pedis artery pulse 2+, posterior tibial artery pulse 2+ Skin : no erythema, no ecchymosis [de-identified] : o 4 views of the LEFT knee obtained, 03/27/2024 demonstrates medial compartment degenerative narrowing with a bone on bone opposition, marginal osteophyte formation, subchondral sclerosis, varus alignment.

## 2024-10-02 NOTE — ADDENDUM
[FreeTextEntry1] : This note was written by Dayna Aguilar on 10/02/2024 acting solely as a scribe for Dr. Jose Manuel Hayward.  All medical record entries made by the Scribe were at my, Dr. Jose Manuel Hayward, direction and personally dictated by me on 10/02/2024. I have personally reviewed the chart and agree that the record accurately reflects my personal performance of the history, physical exam, assessment and plan. 
[FreeTextEntry1] : This note was written by Dayna Aguilar on 10/02/2024 acting solely as a scribe for Dr. Jose Manuel Hayward.  All medical record entries made by the Scribe were at my, Dr. Jose Manuel Hayward, direction and personally dictated by me on 10/02/2024. I have personally reviewed the chart and agree that the record accurately reflects my personal performance of the history, physical exam, assessment and plan. 
Yes

## 2024-10-02 NOTE — DISCUSSION/SUMMARY
[de-identified] : 78 year male old with osteoarthritis left knee. We discussed at length the nature of the patient's condition. He is doing better and managing his pain well since last visit. We discussed all options and conservative measures, such as ice, NSAIDs, physical therapy, exercise limitations, and injections.  I discussed with patient that if and when patient feels quality of life is significant compromised and unresponsive to conservative management patient will be considered a candidate for total left replacement. I discussed with patient that if and when patient feels quality of life is significant compromised and unresponsive to conservative management patient will be considered a candidate for total left replacement.  FU 6 months.   The patient understood and verbally agreed to the treatment plan. All of their questions were answered. The patient should call the office if they have any questions or experience worsening symptoms.

## 2024-10-02 NOTE — PHYSICAL EXAM
[de-identified] : Constitutional: Well nourished , well developed and in no acute distress Psychiatric: Alert and oriented to time place and person.Appropriate affect . Skin: Head, neck, arms and lower extremities:no lesions or discoloration HEENT: Normocephalic, EOM intact, Nasal septum midline, Respiratory: Unlabored respirations,no audible wheezing ,no tachypnea, no cyanosis Cardiovascular: No leg swelling no ankle edema no JVD, pulse regular Vascular: No calf or thigh tenderness, Peripheral pulses: intact Lymphatics: No groin adenopathy,no lymphedema lower or upper extremities   o Left Knee Exam: Inspection/Palpation : Palpable prominence over the popliteal fossa, Effusion 2+, Mild varus deformity Range of Motion : 0 - 125 degrees, + crepitus Stability : no valgus or varus instability present on provocative testing, Lachmans Test (-) Sensation : sensation to pin intact Vascular Exam : no edema, no cyanosis, dorsalis pedis artery pulse 2+ Skin : no erythema, no ecchymosis   o Right Knee Exam: Inspection/Palpation : no tenderness to palpation, no swelling, no deformity, surgical incision well healed. Range of Motion : 0 - 125 degrees, no crepitus Stability : no valgus or varus instability present on provocative testing, Lachmans Test (-) Sensation : sensation to pin intact Vascular Exam : no edema, no cyanosis, dorsalis pedis artery pulse 2+, posterior tibial artery pulse 2+ Skin : no erythema, no ecchymosis [de-identified] : o 4 views of the LEFT knee obtained, 03/27/2024 demonstrates medial compartment degenerative narrowing with a bone on bone opposition, marginal osteophyte formation, subchondral sclerosis, varus alignment.

## 2024-10-02 NOTE — HISTORY OF PRESENT ILLNESS
[de-identified] : NEW MC is a 78-year-old male who presents for follow-up of left knee pain. Patient presents ambulating independently.  Patient reports pain intermittently since 2023. Denies any trauma or injury. Patient reports pain when doing "extra" however, day to day activities is pain free. The patient describes the pain as a dull aching, localized to the anterior suprapatellar aspect of his knee that is intermittent in nature. Provocation of pain by walking, stairs and prolonged activity. Denies any swelling, locking, or giving out of the knee.   Of note, SHx of right total knee replacement ~10 years ago.

## 2025-04-02 ENCOUNTER — APPOINTMENT (OUTPATIENT)
Dept: ORTHOPEDIC SURGERY | Facility: CLINIC | Age: 80
End: 2025-04-02

## 2025-09-06 ENCOUNTER — NON-APPOINTMENT (OUTPATIENT)
Age: 80
End: 2025-09-06

## 2025-09-08 ENCOUNTER — APPOINTMENT (OUTPATIENT)
Dept: ORTHOPEDIC SURGERY | Facility: CLINIC | Age: 80
End: 2025-09-08
Payer: COMMERCIAL

## 2025-09-08 VITALS — HEIGHT: 68 IN | BODY MASS INDEX: 29.55 KG/M2 | WEIGHT: 195 LBS

## 2025-09-08 DIAGNOSIS — M17.12 UNILATERAL PRIMARY OSTEOARTHRITIS, LEFT KNEE: ICD-10-CM

## 2025-09-08 PROCEDURE — 73564 X-RAY EXAM KNEE 4 OR MORE: CPT | Mod: LT

## 2025-09-08 PROCEDURE — 99214 OFFICE O/P EST MOD 30 MIN: CPT
